# Patient Record
Sex: FEMALE | Race: BLACK OR AFRICAN AMERICAN | Employment: STUDENT | URBAN - METROPOLITAN AREA
[De-identification: names, ages, dates, MRNs, and addresses within clinical notes are randomized per-mention and may not be internally consistent; named-entity substitution may affect disease eponyms.]

---
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
## 2024-02-19 ENCOUNTER — HOSPITAL ENCOUNTER (EMERGENCY)
Age: 19
Discharge: HOME | End: 2024-02-19
Attending: EMERGENCY MEDICINE
Payer: COMMERCIAL

## 2024-02-19 VITALS
DIASTOLIC BLOOD PRESSURE: 98 MMHG | SYSTOLIC BLOOD PRESSURE: 113 MMHG | RESPIRATION RATE: 17 BRPM | WEIGHT: 135 LBS | BODY MASS INDEX: 21.69 KG/M2 | HEART RATE: 83 BPM | TEMPERATURE: 97.9 F | HEIGHT: 66 IN | OXYGEN SATURATION: 99 %

## 2024-02-19 DIAGNOSIS — S00.03XA CONTUSION OF SCALP, INITIAL ENCOUNTER: Primary | ICD-10-CM

## 2024-02-19 PROCEDURE — 99282 EMERGENCY DEPT VISIT SF MDM: CPT

## 2024-02-19 ASSESSMENT — ENCOUNTER SYMPTOMS
VOMITING: 0
EYE DISCHARGE: 0
COUGH: 0
PHOTOPHOBIA: 0
WHEEZING: 0
CHEST TIGHTNESS: 0
ABDOMINAL DISTENTION: 0
ABDOMINAL PAIN: 0
SHORTNESS OF BREATH: 0
SORE THROAT: 0

## 2024-02-19 ASSESSMENT — PAIN - FUNCTIONAL ASSESSMENT: PAIN_FUNCTIONAL_ASSESSMENT: 0-10

## 2024-02-19 ASSESSMENT — PAIN SCALES - GENERAL: PAINLEVEL_OUTOF10: 5

## 2024-02-19 ASSESSMENT — LIFESTYLE VARIABLES
HOW MANY STANDARD DRINKS CONTAINING ALCOHOL DO YOU HAVE ON A TYPICAL DAY: PATIENT DOES NOT DRINK
HOW OFTEN DO YOU HAVE A DRINK CONTAINING ALCOHOL: NEVER

## 2024-02-19 ASSESSMENT — PAIN DESCRIPTION - LOCATION: LOCATION: HEAD

## 2024-02-19 NOTE — ED TRIAGE NOTES
Pt was standing up from couch and hit head on end table. Did have a lot of bleeding at first but now bleeding has stopped. While being triaged, expressed SI/HI thoughts. More so SI. States she is the type of person to hold everything in until it just boils over. 2 hours ago was really down on herself and was considering a bottle of pills she has but didn't take any. Doesn't indicate any specific event. Has Hx of cutting self but has been about 3-4 years since doing so. Never on medications or diagnosis with mental health issues.

## 2024-02-19 NOTE — ED NOTES
Pt went back to behavioral health for evaluation due to admitting to being suicidal to DALLIN Brasher during triage. Pt reported to behavioral health nurse DALLIN Martinez that she did not want to go back to  because she felt \"crazy\" and wanted to talk with someone and go home. Pt was educated that there is a process that we have to follow when going back to behavioral health. Pt reported she does not want to stay and would like to follow up with out patient. Dr. Maynard notified and did not feel the pt needed to be pink slipped at this time. Pt was educated on resources such as [facility] and was given brochures on different resources. Pt was advised that if she feels worse or unsafe to come back to the ER immediately.

## 2024-02-19 NOTE — ED PROVIDER NOTES
agree. I agree with the disposition decision on this patient.    Riley Maynard MD  Attending Emergency Physician      RADIOLOGY:   Non-plain filmimages such as CT, Ultrasound and MRI are read by the radiologist. Plain radiographic images are visualized and preliminarily interpreted by the emergency physician with the below findings:      Interpretation per the Radiologist below, if available at the time ofthis note:    No orders to display         ED BEDSIDE ULTRASOUND:   Performed by ED Physician - none    LABS:  Labs Reviewed   ACETAMINOPHEN LEVEL   CBC WITH AUTO DIFFERENTIAL   CK   COMPREHENSIVE METABOLIC PANEL   ETHANOL   LIPID PANEL   PREGNANCY, URINE   SALICYLATE LEVEL   TSH   URINE DRUG SCREEN   URINALYSIS WITH REFLEX TO CULTURE   HEMOGLOBIN A1C       All other labs were within normal range or not returned as of this dictation.    EMERGENCY DEPARTMENT COURSE and DIFFERENTIAL DIAGNOSIS/MDM:   Vitals:    Vitals:    02/19/24 0349   BP: (!) 113/98   Pulse: 83   Resp: 17   Temp: 97.9 °F (36.6 °C)   SpO2: 99%   Weight: 61.2 kg (135 lb)   Height: 1.676 m (5' 6\")        MDM  Patient was medically cleared by me sent back to the behavioral health area for further assessment.  Apparently during their initial evaluation there she is adamant about not being suicidal and they gave her some information for the [facility] or if she feels unsafe with herself.  She was essentially discharged without me being aware and following up in his chart and they said the patient was not suicidal and did not need to be seen.  She was not under the influence of any alcohol or drugs.      CONSULTS:  None    PROCEDURES:  Unless otherwise noted below, none     Procedures    FINAL IMPRESSION      1. Contusion of scalp, initial encounter          DISPOSITION/PLAN   DISPOSITION Decision To Discharge 02/19/2024 05:50:32 AM      PATIENT REFERRED TO:  Theresa Momin  [address] [place] [address]  [id number]  [facility] [id number]  [phone]    In 2